# Patient Record
Sex: FEMALE | Race: WHITE | Employment: FULL TIME | URBAN - METROPOLITAN AREA
[De-identification: names, ages, dates, MRNs, and addresses within clinical notes are randomized per-mention and may not be internally consistent; named-entity substitution may affect disease eponyms.]

---

## 2021-11-13 ENCOUNTER — HOSPITAL ENCOUNTER (EMERGENCY)
Facility: CLINIC | Age: 61
Discharge: HOME | End: 2021-11-13
Attending: EMERGENCY MEDICINE | Admitting: EMERGENCY MEDICINE
Payer: COMMERCIAL

## 2021-11-13 VITALS
OXYGEN SATURATION: 99 % | SYSTOLIC BLOOD PRESSURE: 168 MMHG | DIASTOLIC BLOOD PRESSURE: 92 MMHG | WEIGHT: 180 LBS | TEMPERATURE: 97.3 F | HEART RATE: 88 BPM | RESPIRATION RATE: 16 BRPM

## 2021-11-13 DIAGNOSIS — M54.50 ACUTE LEFT-SIDED LOW BACK PAIN WITHOUT SCIATICA: ICD-10-CM

## 2021-11-13 PROCEDURE — 250N000011 HC RX IP 250 OP 636: Performed by: EMERGENCY MEDICINE

## 2021-11-13 PROCEDURE — 99284 EMERGENCY DEPT VISIT MOD MDM: CPT | Mod: 25

## 2021-11-13 PROCEDURE — 96372 THER/PROPH/DIAG INJ SC/IM: CPT | Performed by: EMERGENCY MEDICINE

## 2021-11-13 PROCEDURE — 250N000013 HC RX MED GY IP 250 OP 250 PS 637: Performed by: EMERGENCY MEDICINE

## 2021-11-13 RX ORDER — KETOROLAC TROMETHAMINE 30 MG/ML
30 INJECTION, SOLUTION INTRAMUSCULAR; INTRAVENOUS ONCE
Status: COMPLETED | OUTPATIENT
Start: 2021-11-13 | End: 2021-11-13

## 2021-11-13 RX ORDER — OXYCODONE HYDROCHLORIDE 5 MG/1
10 TABLET ORAL ONCE
Status: COMPLETED | OUTPATIENT
Start: 2021-11-13 | End: 2021-11-13

## 2021-11-13 RX ORDER — CYCLOBENZAPRINE HCL 10 MG
10 TABLET ORAL ONCE
Status: COMPLETED | OUTPATIENT
Start: 2021-11-13 | End: 2021-11-13

## 2021-11-13 RX ORDER — CYCLOBENZAPRINE HCL 10 MG
10 TABLET ORAL 3 TIMES DAILY PRN
Qty: 20 TABLET | Refills: 0 | Status: SHIPPED | OUTPATIENT
Start: 2021-11-13

## 2021-11-13 RX ADMIN — OXYCODONE HYDROCHLORIDE 10 MG: 5 TABLET ORAL at 17:50

## 2021-11-13 RX ADMIN — KETOROLAC TROMETHAMINE 30 MG: 30 INJECTION, SOLUTION INTRAMUSCULAR; INTRAVENOUS at 17:50

## 2021-11-13 RX ADMIN — CYCLOBENZAPRINE HYDROCHLORIDE 10 MG: 10 TABLET, FILM COATED ORAL at 17:50

## 2021-11-13 ASSESSMENT — ENCOUNTER SYMPTOMS
BACK PAIN: 1
ABDOMINAL PAIN: 0

## 2021-11-13 NOTE — ED PROVIDER NOTES
History   Chief Complaint:  Back pain      The history is provided by the patient.      Luh Chowdhury is a 61 year old female with history of back pain who presents with back spasms. Patient states having intermittent back pain in her spine for the past 2 weeks. Patient has been having back pain for he past 10 years but states symptoms today are worse and lasted longer. She notes back pain runs in her family as her mother, sister, and aunt all have this problem. Denies abdominal pain, or bladder symptoms. Denies back injury or heavy lifting. No history of back surgery. Patient takes Flexeril and Norco.       Review of Systems   Gastrointestinal: Negative for abdominal pain.   Musculoskeletal: Positive for back pain.   All other systems reviewed and are negative.        Allergies:  The patient has no known allergies.     Medications:  Norco  Flexeril     Past Medical History:    Back pain   Hyperglycemia  Pleurodynia  Hemorrhoids     Past Surgical History:    Tympanostomy tube  Hysterectomy  Knee surgery     Family History:    Father: type 2 diabetes mellitus     Social History:  Patient was accompanied to the ED.     Physical Exam     Patient Vitals for the past 24 hrs:   BP Temp Temp src Pulse Resp SpO2 Weight   11/13/21 1538 (!) 184/119 -- -- -- -- -- --   11/13/21 1537 -- -- -- 109 -- -- 81.6 kg (180 lb)   11/13/21 1536 -- 97.3  F (36.3  C) Temporal -- 22 99 % --       Physical Exam  Constitutional: Alert, attentive  HENT:    Nose: Nose normal.    Mouth/Throat: Oropharynx is clear, mucous membranes are moist  Eyes: EOM are normal.    CV: brisk capillary refill to the distal extremities, 2+ DP pulses bilaterally.  Chest: Effort normal and breath sounds normal.   GI: No distension. There is no tenderness to the RUQ, RLQ or LLQ. No Peck's sign or McBurney's point tenderness. No palpable, pulsatile mass.  MSK: Normal range of motion. No midline T/L/S spine tenderness   Tender to the left upper lumbar  perispinous muscles, reproduces pain; normal inspection  Neurological: Alert, attentive.  5/5 strength   sensation intact to the lower extremities;   normal gait  Skin: Skin is warm and dry.       Emergency Department Course     Emergency Department Course:    Reviewed:  I reviewed nursing notes, vitals, past medical history and care everywhere    Assessments:  1736 I obtained history and examined the patient as noted above.   1925 I rechecked the patient. She is ready to be discharged.     Interventions:  1750 Toradol 30 mg IM  1750 Oxycodone 10 mg PO  1750 Flexeril 10 mg PO    Disposition:  The patient was discharged to home.       Impression & Plan     Medical Decision Making:  This is a very pleasant 61 year old female who presented with left lumbar perispinous back pain described as muscle spasms. Pain is reproducible with palpation, and there is not midline tenderness. Pain has improved with interventions in the emergency department.  The patient did not sustain any trauma, therefore x-rays are not necessary due to the low likelihood of fracture or subluxation. The patient has not had a fever, saddle/perineal anesthesia, bilateral foot numbness, or bowel or bladder dysfunction.  There is no clinical evidence of cauda equina syndrome, discitis, spinal/epidural space hematoma or epidural abscess. The neurological exam is normal and the patient's symptoms are consistent with a musculoskeletal (myofascial) strain vs. spasm. The patient will be discharged with flexeril and continued use of ibuprofen. Plan primary care follow-up within 3 days to re-assess symptoms and spine follow-up.  Return precautions for weakness, intractable pain, incontinence, or any other concerns..          Diagnosis:    ICD-10-CM    1. Acute left-sided low back pain without sciatica  M54.50        Discharge Medications:  New Prescriptions    CYCLOBENZAPRINE (FLEXERIL) 10 MG TABLET    Take 1 tablet (10 mg) by mouth 3 times daily as needed  for muscle spasms       Scribe Disclosure:  I, Rossi Paz, am serving as a scribe at 5:36 PM on 11/13/2021 to document services personally performed by Constantino Pride MD based on my observations and the provider's statements to me.            Constantino Pride MD  11/13/21 2119

## 2021-11-17 ENCOUNTER — HOSPITAL ENCOUNTER (OUTPATIENT)
Dept: MRI IMAGING | Facility: CLINIC | Age: 61
Discharge: HOME | End: 2021-11-17
Attending: FAMILY MEDICINE | Admitting: FAMILY MEDICINE
Payer: COMMERCIAL

## 2021-11-17 DIAGNOSIS — M54.9 BACK PAIN WITHOUT RADIATION: ICD-10-CM

## 2021-11-17 PROCEDURE — 72148 MRI LUMBAR SPINE W/O DYE: CPT

## 2021-11-17 RX ORDER — GADOBUTROL 604.72 MG/ML
10 INJECTION INTRAVENOUS ONCE
Status: DISCONTINUED | OUTPATIENT
Start: 2021-11-17 | End: 2021-11-17 | Stop reason: CLARIF